# Patient Record
Sex: MALE | Race: WHITE | NOT HISPANIC OR LATINO | Employment: STUDENT | URBAN - METROPOLITAN AREA
[De-identification: names, ages, dates, MRNs, and addresses within clinical notes are randomized per-mention and may not be internally consistent; named-entity substitution may affect disease eponyms.]

---

## 2022-06-23 ENCOUNTER — MEDICAL CORRESPONDENCE (OUTPATIENT)
Dept: HEALTH INFORMATION MANAGEMENT | Facility: CLINIC | Age: 22
End: 2022-06-23

## 2022-07-06 ENCOUNTER — THERAPY VISIT (OUTPATIENT)
Dept: OCCUPATIONAL THERAPY | Facility: CLINIC | Age: 22
End: 2022-07-06
Payer: COMMERCIAL

## 2022-07-06 DIAGNOSIS — S62.316P: ICD-10-CM

## 2022-07-06 PROCEDURE — 97165 OT EVAL LOW COMPLEX 30 MIN: CPT | Mod: GO | Performed by: OCCUPATIONAL THERAPIST

## 2022-07-06 PROCEDURE — 97110 THERAPEUTIC EXERCISES: CPT | Mod: GO | Performed by: OCCUPATIONAL THERAPIST

## 2022-07-06 NOTE — PROGRESS NOTES
Hand Therapy Initial Evaluation    Current Date:  7/6/2022  Dr. Yates Orders 6/23/2022   Modalities, edema management, AROM, finem otor dexterity, strengthening    Diagnosis: L 5th Metacarpal Fracture   DOI: April 21, 2022  DOS: April 26. 2022  Procedure:  K wire fixation  Post:  10w 0d    Precautions: none    Subjective:  Ang Guzman is a 21 year old male.    Patient reports symptoms of the left 5 th metacarpal which occurred due to fall. Since onset symptoms are Gradually getting better.  General health as reported by patient is excellent.  Pertinent medical history includes:None  Medical allergies:none.  Surgical history: orthopedic: none other than current surgery for  Fixation of Left metacarpal.  Medication history: None.    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    Previous treatment: Splint and Cast  Barriers include:none  Mobility: No difficulty  Transportation: drives  Currently working in normal job without restrictions  Leisure activities/hobbies: Sports, friends gathering, travel, outdoor        Functional Outcome Measure:   75/80 Mild limitation in , throwing ball and washing hair, moderate limitation in recreational sports     Objective:  Pain Level (Scale 0-10)   7/6/2022   At Rest 0   With Use 2     Pain Description  Date 7/6/2022   Location wrist and small finger   Pain Quality Sharp   Frequency intermittent     Pain is worst  daytime   Exacerbated by  radial deviation   Relieved by heat and stretch   Progression Gradually improving     Edema (Circumference measured in cm)   7/6/2022 7/6/2022    R L   MCP crease 19.7 20cm   PIP     P2       Sensation   WNL throughout  all nerve distributions; per patient report    Scar  Moderate scar formation around pin sites and over metacarpal, sensitive to pressureand stretch.    ROM  Small  Finger 7/6/2022 7/6/2022   AROM (PROM) R L   MCP /90 /65   PIP /105 /90   DIP /70 /65   [place]       Strength   (Measured in pounds)  Pain Report: - none  + mild    ++ moderate    +++ severe    7/6/2022 7/6/2022   Trials R L   1  2  3 100 50   Average 100 50       Assessment:  Patient presents with symptoms consistent with diagnosis of left small finger 5th Metacarpal Fracture, with surgical  intervention.     Patient's limitations or Problem List includes:  Pain, Decreased ROM/motion, Increased edema, Weakness and Adherence in connective tissue of the left small finger which interferes with the patient's ability to perform Recreational Activities and Household Chores as compared to previous level of function.    Rehab Potential:  Excellent - Return to full activity, no limitations    Patient will benefit from skilled Occupational Therapy to increase ROM, flexibility and  strength to return to previous activity level and resume normal daily tasks and to reach their rehab potential.    Barriers to Learning:  No barrier    Communication Issues:  Patient appears to be able to clearly communicate and understand verbal and written communication and follow directions correctly.    Chart Review: Brief history including review of medical and/or therapy records relating to the presenting problem    Identified Performance Deficits: home establishment and management and leisure activities    Assessment of Occupational Performance:  1-3 Performance Deficits    Clinical Decision Making (Complexity): Low complexity    Treatment Explanation:  The following has been discussed with the patient:  RX ordered/plan of care  Anticipated outcomes  Possible risks and side effects    Plan:  Frequency:  1 X week, once daily  Duration:  for 1 months    Treatment Plan:     Modalities:    US and Paraffin   Therapeutic Exercise:    AROM, AAROM, Tendon Gliding, Blocking, Isotonics and Isometrics  Therapeutic Activities:   Functional activities     Neuromuscular re-ed:   Nerve Gliding and Desensitization  Manual Techniques:   Joint mobilization, Scar mobilization and Myofascial release      Discharge Plan:  Achieve all LTG.  Independent in home treatment program.  Reach maximal therapeutic benefit.    Home Exercise Program  PTRX Hand AROM  Heat  Scar mobilization/ spoon  Cica care and tubigrip    Next Visit  Progress to hand strengthening as tolerated  Assess scar response to cica care and tubigrip, add US if needed

## 2022-07-06 NOTE — PROGRESS NOTES
[facility]    OUTPATIENT Occupational Therapy ORTHOPEDIC EVALUATION  PLAN OF TREATMENT FOR OUTPATIENT REHABILITATION  (COMPLETE FOR INITIAL CLAIMS ONLY)  Patient's Last Name, First Name, M.I.  YOB: 2000  Ang Guzman    Provider s Name:  [facility]   Medical Record No.  [id number]   Start of Care Date:      Onset Date:  04/26/2204/21/22   Type:     __PT   __x_OT Medical Diagnosis:    Encounter Diagnosis   Name Primary?     Displaced fracture of base of fifth metacarpal bone, right hand, subsequent encounter for fracture with malunion         Treatment Diagnosis:  Left 5th metacarpal Fracture        Goals:     07/06/22 0500   Goal #1   Goal #1 household chores   Previous Performance Level Independent   Current Functional Task    Current Performance Level Mild difficulty opening jars   STG [employer] Open a tight or new jar   Due Date 08/11/22       Therapy Frequency:   1x per week   Predicted Duration of Therapy Intervention:   4 weeks    Sara Dowd OT                 I CERTIFY THE NEED FOR THESE SERVICES FURNISHED UNDER        THIS PLAN OF TREATMENT AND WHILE UNDER MY CARE     (Physician attestation of this document indicates review and certification of the therapy plan).                     Certification Date From:    7/6/2022-10/6/2022  Certification Date To:       Referring Provider: Ashish Platt    Initial Assessment        See Epic Evaluation

## 2022-07-14 ENCOUNTER — THERAPY VISIT (OUTPATIENT)
Dept: OCCUPATIONAL THERAPY | Facility: CLINIC | Age: 22
End: 2022-07-14
Payer: COMMERCIAL

## 2022-07-14 DIAGNOSIS — S62.316P: Primary | ICD-10-CM

## 2022-07-14 PROCEDURE — 97110 THERAPEUTIC EXERCISES: CPT | Mod: GO | Performed by: OCCUPATIONAL THERAPIST

## 2022-07-14 PROCEDURE — 97140 MANUAL THERAPY 1/> REGIONS: CPT | Mod: GO | Performed by: OCCUPATIONAL THERAPIST

## 2022-07-14 NOTE — PROGRESS NOTES
SOAP Note objective information for 7/14/2022    ROM  Small  Finger 7/6/2022 7/6/2022 7/14/22   AROM (PROM) R L L   MCP /90 /65 0/77   PIP /105 /90 0/96   DIP /70 /65 0/65   [place]        ROM  Pain Report: 0-10/10  Wrist 7/14/2022 7/14/2022   AROM (PROM) R L   RD 19 20   UD 32 16     Please refer to the daily flowsheet for treatment today, total treatment time and time spent performing 1:1 timed codes.

## 2022-07-20 ENCOUNTER — THERAPY VISIT (OUTPATIENT)
Dept: OCCUPATIONAL THERAPY | Facility: CLINIC | Age: 22
End: 2022-07-20
Payer: COMMERCIAL

## 2022-07-20 DIAGNOSIS — S62.316P: Primary | ICD-10-CM

## 2022-07-20 PROCEDURE — 97110 THERAPEUTIC EXERCISES: CPT | Mod: GO | Performed by: OCCUPATIONAL THERAPIST

## 2022-07-20 PROCEDURE — 97140 MANUAL THERAPY 1/> REGIONS: CPT | Mod: GO | Performed by: OCCUPATIONAL THERAPIST

## 2022-07-20 NOTE — PROGRESS NOTES
SOAP Note objective information for 7/20/2022    ROM  Small  Finger 7/6/2022 7/6/2022 7/14/22 7/20/22   AROM (PROM) R L L L   MCP /90 /65 0/77 0/80   PIP /105 /90 0/96 0/94   DIP /70 /65 0/65 0/77   [place]         ROM  Pain Report: 0-10/10  Wrist 7/14/2022 7/14/2022 7/20/22   AROM (PROM) R L L   RD 19 20 NT   UD 32 16 17     Please refer to the daily flowsheet for treatment today, total treatment time and time spent performing 1:1 timed codes.

## 2022-08-05 ENCOUNTER — THERAPY VISIT (OUTPATIENT)
Dept: OCCUPATIONAL THERAPY | Facility: CLINIC | Age: 22
End: 2022-08-05
Payer: COMMERCIAL

## 2022-08-05 DIAGNOSIS — S62.316P: Primary | ICD-10-CM

## 2022-08-05 PROCEDURE — 97110 THERAPEUTIC EXERCISES: CPT | Mod: GO | Performed by: OCCUPATIONAL THERAPIST

## 2022-08-05 PROCEDURE — 97140 MANUAL THERAPY 1/> REGIONS: CPT | Mod: GO | Performed by: OCCUPATIONAL THERAPIST

## 2022-08-09 PROBLEM — S62.316P: Status: RESOLVED | Noted: 2022-07-06 | Resolved: 2022-08-09

## 2022-08-10 NOTE — PROGRESS NOTES
Pt stated he feels he is able to manage his HEP independently.  Discharge [facility].